# Patient Record
Sex: FEMALE | Race: WHITE | URBAN - METROPOLITAN AREA
[De-identification: names, ages, dates, MRNs, and addresses within clinical notes are randomized per-mention and may not be internally consistent; named-entity substitution may affect disease eponyms.]

---

## 2018-07-16 ENCOUNTER — TRANSFERRED RECORDS (OUTPATIENT)
Dept: HEALTH INFORMATION MANAGEMENT | Facility: CLINIC | Age: 28
End: 2018-07-16

## 2020-11-10 ENCOUNTER — OFFICE VISIT (OUTPATIENT)
Dept: OBGYN | Facility: CLINIC | Age: 30
End: 2020-11-10
Attending: ADVANCED PRACTICE MIDWIFE
Payer: COMMERCIAL

## 2020-11-10 VITALS
HEART RATE: 96 BPM | BODY MASS INDEX: 19.09 KG/M2 | HEIGHT: 64 IN | DIASTOLIC BLOOD PRESSURE: 64 MMHG | WEIGHT: 111.8 LBS | SYSTOLIC BLOOD PRESSURE: 97 MMHG

## 2020-11-10 DIAGNOSIS — Z87.42 HISTORY OF ABNORMAL CERVICAL PAP SMEAR: Chronic | ICD-10-CM

## 2020-11-10 DIAGNOSIS — Z00.00 ROUTINE GENERAL MEDICAL EXAMINATION AT A HEALTH CARE FACILITY: Primary | ICD-10-CM

## 2020-11-10 DIAGNOSIS — Z23 FLU VACCINE NEED: ICD-10-CM

## 2020-11-10 PROCEDURE — G0008 ADMIN INFLUENZA VIRUS VAC: HCPCS

## 2020-11-10 PROCEDURE — 250N000011 HC RX IP 250 OP 636

## 2020-11-10 PROCEDURE — 99385 PREV VISIT NEW AGE 18-39: CPT | Performed by: ADVANCED PRACTICE MIDWIFE

## 2020-11-10 PROCEDURE — 90686 IIV4 VACC NO PRSV 0.5 ML IM: CPT

## 2020-11-10 PROCEDURE — G0463 HOSPITAL OUTPT CLINIC VISIT: HCPCS

## 2020-11-10 SDOH — HEALTH STABILITY: MENTAL HEALTH: HOW OFTEN DO YOU HAVE A DRINK CONTAINING ALCOHOL?: NOT ASKED

## 2020-11-10 SDOH — HEALTH STABILITY: MENTAL HEALTH: HOW OFTEN DO YOU HAVE 6 OR MORE DRINKS ON ONE OCCASION?: NOT ASKED

## 2020-11-10 SDOH — HEALTH STABILITY: MENTAL HEALTH: HOW MANY STANDARD DRINKS CONTAINING ALCOHOL DO YOU HAVE ON A TYPICAL DAY?: NOT ASKED

## 2020-11-10 ASSESSMENT — MIFFLIN-ST. JEOR: SCORE: 1222.12

## 2020-11-10 NOTE — LETTER
Date:November 11, 2020      Patient was self referred, no letter generated. Do not send.        [facility] Health Information

## 2020-11-10 NOTE — LETTER
11/10/2020       RE: Monse Moser  [address]  [address]  [facility] [address]     Dear Colleague,    Thank you for referring your patient, Monse Moser, to the [facility] [place] at [facility]. Please see a copy of my visit note below.    Progress Note    SUBJECTIVE:  Monse Moser is a pleasant 30 year old, , who is here for annual visit.    Concerns today include: Just wants to ensure can keep mirena in for 7 years.  Summer 2015 had mirena IUD placed for contraception. Is satisfied with mirena, would get it again.  Menstrual History: LMP:10/25/20. Occur q 4 weeks, last 4-5 days,  No heavy bleeding, moderate cramps. Okay without OTC meds.  Sexually active, one partner, male, No concerns for STIs. Declines testing.  Vegetarian for adult life supplements with Vitamins B12, D.    Denies concerns for breast lumps, nipple changes, nipple discharge. Maternal history of breast cancer around 59yo. Denies family hx other breast, ovarian, cervical cancers.        Last  No results found for: PAP  History of abnormal Pap smear: YES - updated in Problem List and Health Maintenance accordingly  Reports Normal pap . Previously had abnormal in Summer 2018, ASCUS. Had 2 paps prior to that with ASCUS. Had colposcopy, remembers being told low grade after colposcopy. Does not believe was told HPV positive. Prior care at [facility] .      Mammogram current: not applicable  Last Mammogram:None    Last Colonoscopy: NA        HISTORY:  Prescription Medications as of 11/10/2020       Rx Number Disp Refills Start End Last Dispensed Date Next Fill Date Owning Pharmacy    levonorgestrel (MIRENA) 20 MCG/24HR IUD            Si each by Intrauterine route once    Class: Historical    Route: Intrauterine        No Known Allergies  Immunization History   Administered Date(s) Administered     Influenza Vaccine IM > 6 months Valent IIV4  "11/10/2020       OB History   No obstetric history on file.     History reviewed. No pertinent past medical history.  Past Surgical History:   Procedure Laterality Date     HC TOOTH EXTRACTION W/FORCEP  2010     Family History   Problem Relation Age of Onset     Breast Cancer Mother      Skin Cancer Father      Heart Disease Maternal Grandmother      Cerebrovascular Disease Maternal Grandfather      Osteoporosis Paternal Grandmother      Lung Cancer Paternal Grandfather      Social History     Socioeconomic History     Marital status: Single     Spouse name: Not on file     Number of children: Not on file     Years of education: Not on file     Highest education level: Not on file   Occupational History     Not on file   Social Needs     Financial resource strain: Not on file     Food insecurity     Worry: Not on file     Inability: Not on file     Transportation needs     Medical: Not on file     Non-medical: Not on file   Tobacco Use     Smoking status: Not on file   Substance and Sexual Activity     Alcohol use: Not on file     Drug use: Not on file     Sexual activity: Not on file   Lifestyle     Physical activity     Days per week: Not on file     Minutes per session: Not on file     Stress: Not on file   Relationships     Social connections     Talks on phone: Not on file     Gets together: Not on file     Attends Orthodoxy service: Not on file     Active member of club or organization: Not on file     Attends meetings of clubs or organizations: Not on file     Relationship status: Not on file     Intimate partner violence     Fear of current or ex partner: Not on file     Emotionally abused: Not on file     Physically abused: Not on file     Forced sexual activity: Not on file   Other Topics Concern     Not on file   Social History Narrative     Not on file     PHQ9 - 9  GAD7 - 4    10 point ROS otherwise negative.  EXAM:  Blood pressure 97/64, pulse 96, height 1.626 m (5' 4\"), weight 50.7 kg (111 lb 12.8 " oz). Body mass index is 19.19 kg/m .  General - pleasant female in no acute distress.  Skin - no suspicious lesions or rashes  EENT-  PERRLA, euthyroid with out palpable nodules  Neck - supple without lymphadenopathy.  Lungs - clear to auscultation bilaterally.  Heart - regular rate and rhythm without murmur.  Abdomen - soft, nontender, nondistended, no masses or organomegaly noted.  Musculoskeletal - no gross deformities.  Neurological - normal strength, sensation, and mental status.    Breast Exam:  Breast: Without visible skin changes. No dimpling or lesions seen.   Breasts supple, non-tender with palpation, no dominant mass, nodularity, or nipple discharge noted bilaterally. Axillary nodes negative.      Pelvic Exam:  EG/BUS: Normal genital architecture without lesions, erythema or abnormal secretions Bartholin's, Urethra, [place]'s normal   Urethral meatus: normal   Urethra: no masses, tenderness, or scarring   Bladder: no masses or tenderness   Vagina: moist, pink, rugae  Cervix: IUD strings palpated  Uterus: midposition,  Adnexa: Within normal limits and No masses, nodularity, tenderness  Rectum:anus normal       ASSESSMENT:  31 yo previously healthy female presenting for annual physical. Medical history reviewed notable for abnormal pap smears with ASCUS. Record releases signed to review prior screening Pap/HPV results. Exam today normal. Will await record review prior to considering next Pap/HPV testing. Reviewed PHQ/CALLIE results today, notes situational symptoms related to election and covid, declines further assistance today. No indications for acute escalation of care. Flu shot today for health maintenance. Reviewed return to clinic as needed in support of mental health, otherwise in 1 year for annual visit. Patient voiced understanding and agreement with plan of care.    Encounter Diagnoses   Name Primary?     Routine general medical examination at a health care facility Yes     History of abnormal  cervical Pap smear         PLAN:   Orders Placed This Encounter   Procedures     FLU VAC PRESRV FREE QUAD SPLIT VIR 3+YRS IM        Additional teaching done at this visit regarding self breast exam and mental health, prenatal vitamin with folate.    Return to clinic in one year.  Follow-up as needed.    Patient seen and discussed with DONTRELL Medellin CNM.    Glenys Jimenez DO  [facility] Family Medicine, PGY-1    I was present with Dr. Jimenez, family medicine resident who participated in the service and in the documentation of the services provided. I have verified the history and personally performed the physical exam and medical decision making, as documented by the student and edited by me. DONTRELL Jones CNM        Again, thank you for allowing me to participate in the care of your patient.      Sincerely,    DONTRELL Jones CNM

## 2020-11-10 NOTE — PROGRESS NOTES
Progress Note    SUBJECTIVE:  Monse Moser is a pleasant 30 year old, , who is here for annual visit.    Concerns today include: Just wants to ensure can keep mirena in for 7 years.  Summer 2015 had mirena IUD placed for contraception. Is satisfied with mirena, would get it again.  Menstrual History: LMP:10/25/20. Occur q 4 weeks, last 4-5 days,  No heavy bleeding, moderate cramps. Okay without OTC meds.  Sexually active, one partner, male, No concerns for STIs. Declines testing.  Vegetarian for adult life supplements with Vitamins B12, D.    Denies concerns for breast lumps, nipple changes, nipple discharge. Maternal history of breast cancer around 61yo. Denies family hx other breast, ovarian, cervical cancers.        Last  No results found for: PAP  History of abnormal Pap smear: YES - updated in Problem List and Health Maintenance accordingly  Reports Normal pap . Previously had abnormal in Summer 2018, ASCUS. Had 2 paps prior to that with ASCUS. Had colposcopy, remembers being told low grade after colposcopy. Does not believe was told HPV positive. Prior care at [facility] .      Mammogram current: not applicable  Last Mammogram:None    Last Colonoscopy: NA        HISTORY:  Prescription Medications as of 11/10/2020       Rx Number Disp Refills Start End Last Dispensed Date Next Fill Date Owning Pharmacy    levonorgestrel (MIRENA) 20 MCG/24HR IUD            Si each by Intrauterine route once    Class: Historical    Route: Intrauterine        No Known Allergies  Immunization History   Administered Date(s) Administered     Influenza Vaccine IM > 6 months Valent IIV4 11/10/2020       OB History   No obstetric history on file.     History reviewed. No pertinent past medical history.  Past Surgical History:   Procedure Laterality Date     HC TOOTH EXTRACTION W/FORCEP       Family History   Problem Relation Age of Onset     Breast Cancer Mother      Skin Cancer Father      Heart  "Disease Maternal Grandmother      Cerebrovascular Disease Maternal Grandfather      Osteoporosis Paternal Grandmother      Lung Cancer Paternal Grandfather      Social History     Socioeconomic History     Marital status: Single     Spouse name: Not on file     Number of children: Not on file     Years of education: Not on file     Highest education level: Not on file   Occupational History     Not on file   Social Needs     Financial resource strain: Not on file     Food insecurity     Worry: Not on file     Inability: Not on file     Transportation needs     Medical: Not on file     Non-medical: Not on file   Tobacco Use     Smoking status: Not on file   Substance and Sexual Activity     Alcohol use: Not on file     Drug use: Not on file     Sexual activity: Not on file   Lifestyle     Physical activity     Days per week: Not on file     Minutes per session: Not on file     Stress: Not on file   Relationships     Social connections     Talks on phone: Not on file     Gets together: Not on file     Attends Adventism service: Not on file     Active member of club or organization: Not on file     Attends meetings of clubs or organizations: Not on file     Relationship status: Not on file     Intimate partner violence     Fear of current or ex partner: Not on file     Emotionally abused: Not on file     Physically abused: Not on file     Forced sexual activity: Not on file   Other Topics Concern     Not on file   Social History Narrative     Not on file     PHQ9 - 9  GAD7 - 4    10 point ROS otherwise negative.  EXAM:  Blood pressure 97/64, pulse 96, height 1.626 m (5' 4\"), weight 50.7 kg (111 lb 12.8 oz). Body mass index is 19.19 kg/m .  General - pleasant female in no acute distress.  Skin - no suspicious lesions or rashes  EENT-  PERRLA, euthyroid with out palpable nodules  Neck - supple without lymphadenopathy.  Lungs - clear to auscultation bilaterally.  Heart - regular rate and rhythm without murmur.  Abdomen - " soft, nontender, nondistended, no masses or organomegaly noted.  Musculoskeletal - no gross deformities.  Neurological - normal strength, sensation, and mental status.    Breast Exam:  Breast: Without visible skin changes. No dimpling or lesions seen.   Breasts supple, non-tender with palpation, no dominant mass, nodularity, or nipple discharge noted bilaterally. Axillary nodes negative.      Pelvic Exam:  EG/BUS: Normal genital architecture without lesions, erythema or abnormal secretions Bartholin's, Urethra, [place]'s normal   Urethral meatus: normal   Urethra: no masses, tenderness, or scarring   Bladder: no masses or tenderness   Vagina: moist, pink, rugae  Cervix: IUD strings palpated  Uterus: midposition,  Adnexa: Within normal limits and No masses, nodularity, tenderness  Rectum:anus normal       ASSESSMENT:  29 yo previously healthy female presenting for annual physical. Medical history reviewed notable for abnormal pap smears with ASCUS. Record releases signed to review prior screening Pap/HPV results. Exam today normal. Will await record review prior to considering next Pap/HPV testing. Reviewed PHQ/CALLIE results today, notes situational symptoms related to election and covid, declines further assistance today. No indications for acute escalation of care. Flu shot today for health maintenance. Reviewed return to clinic as needed in support of mental health, otherwise in 1 year for annual visit. Patient voiced understanding and agreement with plan of care.    Encounter Diagnoses   Name Primary?     Routine general medical examination at a health care facility Yes     History of abnormal cervical Pap smear         PLAN:   Orders Placed This Encounter   Procedures     FLU VAC PRESRV FREE QUAD SPLIT VIR 3+YRS IM        Additional teaching done at this visit regarding self breast exam and mental health, prenatal vitamin with folate.    Return to clinic in one year.  Follow-up as needed.    Patient seen and  discussed with DONTRELL Medellin CNM.    Glenys Jimenez,   [facility] Family Medicine, PGY-1    I was present with Dr. Jimenez, family medicine resident who participated in the service and in the documentation of the services provided. I have verified the history and personally performed the physical exam and medical decision making, as documented by the student and edited by me. DONTRELL Jones CNM

## 2021-01-09 ENCOUNTER — HEALTH MAINTENANCE LETTER (OUTPATIENT)
Age: 31
End: 2021-01-09

## 2021-10-11 ENCOUNTER — HEALTH MAINTENANCE LETTER (OUTPATIENT)
Age: 31
End: 2021-10-11

## 2022-01-30 ENCOUNTER — HEALTH MAINTENANCE LETTER (OUTPATIENT)
Age: 32
End: 2022-01-30

## 2022-09-24 ENCOUNTER — HEALTH MAINTENANCE LETTER (OUTPATIENT)
Age: 32
End: 2022-09-24

## 2023-05-08 ENCOUNTER — HEALTH MAINTENANCE LETTER (OUTPATIENT)
Age: 33
End: 2023-05-08